# Patient Record
Sex: FEMALE
[De-identification: names, ages, dates, MRNs, and addresses within clinical notes are randomized per-mention and may not be internally consistent; named-entity substitution may affect disease eponyms.]

---

## 2021-08-17 ENCOUNTER — HOSPITAL ENCOUNTER (EMERGENCY)
Dept: HOSPITAL 15 - ER | Age: 46
Discharge: HOME | End: 2021-08-17
Payer: SELF-PAY

## 2021-08-17 VITALS — SYSTOLIC BLOOD PRESSURE: 103 MMHG | DIASTOLIC BLOOD PRESSURE: 54 MMHG

## 2021-08-17 VITALS — BODY MASS INDEX: 34.96 KG/M2 | WEIGHT: 190 LBS | HEIGHT: 62 IN

## 2021-08-17 DIAGNOSIS — N20.0: Primary | ICD-10-CM

## 2021-08-17 DIAGNOSIS — K80.80: ICD-10-CM

## 2021-08-17 DIAGNOSIS — I10: ICD-10-CM

## 2021-08-17 LAB
ALBUMIN SERPL-MCNC: 3.7 G/DL (ref 3.4–5)
ALP SERPL-CCNC: 94 U/L (ref 45–117)
ALT SERPL-CCNC: 37 U/L (ref 13–56)
ANION GAP SERPL CALCULATED.3IONS-SCNC: 6 MMOL/L (ref 5–15)
BILIRUB SERPL-MCNC: 0.4 MG/DL (ref 0.2–1)
BUN SERPL-MCNC: 9 MG/DL (ref 7–18)
BUN/CREAT SERPL: 13.6
CALCIUM SERPL-MCNC: 8.5 MG/DL (ref 8.5–10.1)
CHLORIDE SERPL-SCNC: 107 MMOL/L (ref 98–107)
CO2 SERPL-SCNC: 27 MMOL/L (ref 21–32)
GLUCOSE SERPL-MCNC: 96 MG/DL (ref 74–106)
HCT VFR BLD AUTO: 43.2 % (ref 36–46)
HGB BLD-MCNC: 14.9 G/DL (ref 12.2–16.2)
LIPASE SERPL-CCNC: 82 U/L (ref 73–393)
MCH RBC QN AUTO: 30.4 PG (ref 28–32)
MCV RBC AUTO: 88.5 FL (ref 80–100)
NRBC BLD QL AUTO: 0 %
POTASSIUM SERPL-SCNC: 3.5 MMOL/L (ref 3.5–5.1)
PROT SERPL-MCNC: 7.6 G/DL (ref 6.4–8.2)
SODIUM SERPL-SCNC: 140 MMOL/L (ref 136–145)

## 2021-08-17 PROCEDURE — 96375 TX/PRO/DX INJ NEW DRUG ADDON: CPT

## 2021-08-17 PROCEDURE — 84484 ASSAY OF TROPONIN QUANT: CPT

## 2021-08-17 PROCEDURE — 96361 HYDRATE IV INFUSION ADD-ON: CPT

## 2021-08-17 PROCEDURE — 87086 URINE CULTURE/COLONY COUNT: CPT

## 2021-08-17 PROCEDURE — 36415 COLL VENOUS BLD VENIPUNCTURE: CPT

## 2021-08-17 PROCEDURE — 96374 THER/PROPH/DIAG INJ IV PUSH: CPT

## 2021-08-17 PROCEDURE — 74176 CT ABD & PELVIS W/O CONTRAST: CPT

## 2021-08-17 PROCEDURE — 99284 EMERGENCY DEPT VISIT MOD MDM: CPT

## 2021-08-17 PROCEDURE — 83690 ASSAY OF LIPASE: CPT

## 2021-08-17 PROCEDURE — 85025 COMPLETE CBC W/AUTO DIFF WBC: CPT

## 2021-08-17 PROCEDURE — 80053 COMPREHEN METABOLIC PANEL: CPT

## 2024-12-27 NOTE — ED.PDOC
General


HPI Comments


49 y.o female with PMH of kidney stones, CHF and HTN, presents to the ED for a 

chief complaint of bilateral flank and back pain associated with abdominal pain 

and a cough that started one week ago. Patient reports pain worsened last night,

described as sharp and constant. Patient had similar pain in the past that 

lasted for week intermittently, was diagnosed with kidney stone and a stent 

placed in. At this time, patient denies any hematuria, fever, chills, nauses, 

vomiting, diarrhea.


Chief Complaint:  Flank Pain


Time Seen by MD:  07:18


Primary Care Provider:  YESSENIA PMD


Reviewed notes:  Nurses Notes, Medications, Allergies


Allergies:  


Coded Allergies:  


     NO KNOWN ALLERGIES (Unverified , 21)


Information Source:  Patient


Mode of Arrival:  Ambulatory


Severity:  Moderate


Timing:  Weeks (1)


Duration:  Since onset


Onset:  Spontaneous


Symptoms:  None


History of:  Kidney stone


Location:  Abdomen, (R) Flank, (L)Flank


Modifying factors:  None


associated signs and symptoms:  Abdominal Pain, Flank Pain, Back Pain





Past Medical History


PAST MEDICAL HISTORY:  CHF, HTN, Kidney Stones


Surgical History:  


Surgical History (Other):  


kidney stent


GYN History:  Denies all GYN Hx





Family History


Family History:  Reviewed,noncontributory to illness





Social History


Smoker:  Non-Smoker


Alcohol:  Denies ETOH Use


Drugs:  Denies Drug Use


Lives In:  Home





Constitutional:  denies: chills, diaphoresis, fatigue, fever, malaise, sweats, 

weakness, others


EENTM:  denies: blurred vision, double vision, ear bleeding, ear discharge, ear 

drainage, ear pain, ear ringing, eye pain, eye redness, hearing loss, mouth 

pain, mouth swelling, nasal discharge, nose bleeding, nose congestion, nose 

pain, photophobia, tearing, throat pain, throat swelling, voice changes, others


Respiratory:  reports: cough; denies: hemoptysis, orthopnea, SOB at rest, 

shortness of breath, SOB with excertion, stridor, wheezing, others


Cardiovascular:  denies: chest pain, dizzy spells, diaphoresis, Dyspnea on 

exertion, edema, irregular heart beat, left arm pain, lightheadedness, 

palpitations, PND, syncope, others


Gastrointestinal:  reports: abdominal pain; denies: abdomen distended, blood 

streaked bowels, constipated, diarrhea, dysphagia, difficulty swallowing, 

hematemesis, melena, nausea, poor appetite, poor fluid intake, rectal bleeding, 

rectal pain, vomiting, others


Genitourinary:  reports: flank pain; denies: abnormal vagina bleeding, burning, 

dyspareunia, dysuria, frequency, hematuria, incontinence, pain, pregnant, vagina

discharge, urgency, others


Neurological:  denies: dizziness, fainting, headache, left sided numbness, left 

sided weakness, numbness, paresthesia, pre-existing deficit, right sided 

numbness, right sided weakness, seizure, speech problems, tingling, tremors, w

eakness, others


Musculoskeletal:  reports: back pain; denies: gout, joint pain, joint swelling, 

muscle pain, muscle stiffness, neck pain, others


Integumetry:  denies: bruises, change in color, change in hair/nails, dryness, 

laceration, lesions, lumps, rash, wounds, others


Allergic/Immunocompromised:  denies: Difficulty Healing, Frequent Infections, 

Hives, Itching, others


Hematologic/Lymphatic:  denies: anemia, blood clots, easy bleeding, easy 

bruising, swollen glands, others


Endocrine:  denies: excessive hunger, excessive sweating, excessive thirst, 

excessive urination, flushing, intolerance to cold, intolerance to heat, 

unexplained weight gain, unexplained weight loss, others


Psychiatric:  denies: anxiety, bipolar disorder, depression, hopeless, panic 

disorder, schizophrenia, sleepless, suicidal, others


All Other Systems:  Reviewed and Negative





Physical Exam


General Appearance:  No Apparent Distress, Normal


HEENT:  Normal ENT Inspection, Pharynx Normal, TMs Normal


Neck:  Full Range of Motion, Non-Tender, Normal, Normal Inspection


Respiratory:  Chest Non-Tender, Lungs Clear, No Accessory Muscle Use, No 

Respiratory Distress, Normal Breath Sounds


Cardiovascular:  No Edema, No JVD, No Murmur, No Gallop, Normal Peripheral 

Pulses, Regular Rate/Rhythm


Breast Exam:  Deferred


Gastrointestinal:  No Organomegaly, Non Tender, No Pulsatile Mass, Normal Bowel 

Sounds, Soft


Genitalia:  Deferred


Pelvic:  Deferred


Rectal:  Deferred


Extremities:  No calf tenderness, Normal capillary refill, Normal inspection, 

Normal range of motion, Non-tender, No pedal edema


Musculoskeletal :  


   Location:  Bilateral


   Extremity Location:  Back, Other (flank )


   Apperance:  Tenderness: Moderate


Neurologic:  Alert, CNs II-XII nml as Tested, No Motor Deficits, Normal Affect, 

Normal Mood, No Sensory Deficits


Cerebellar Function:  Normal


Reflexes:  Normal


Skin:  Dry, Normal Color, Warm


Lymphatic:  No Adenopathy





Was a procedure done?


Was a procedure done?:  No





Differential Diagnosis


Kidney stone (Female):  Hepatitis, Musculoskeletal pain, Pancreatitis, 

Pyelonephritis, Renal failure, Strain, Urolithiasis


Kidney stone (Male):  Bowel obstruction, Cholelithiasis, DJD, Hepatitis, 

Cholangitis, Pancreatitis, Pyelonephritis, Renal failure, Urolithiasis, Renal 

infarction, Urinary tract infection, N/A





X-Ray, Labs, Meds, VS





                                   Vital Signs








  Date Time  Temp Pulse Resp B/P (MAP) Pulse Ox O2 Delivery O2 Flow Rate FiO2


 


24 07:35 98.3 91 20 158/97 (117) 97   








                                       Lab








Test


 24


08:07 24


07:30 Range/Units


 


 


White Blood Count


 6.8 


 


 4.4-10.8


10^3/uL


 


Red Blood Count


 4.89 


 


 4.0-5.20


10^6/uL


 


Hemoglobin 14.7   12.2-16.2  g/dL


 


Hematocrit 42.8   36.0-46.0  %


 


Mean Corpuscular Volume 87.5   80.0-100.0  fL


 


Mean Corpuscular Hemoglobin 30.0   28.0-32.0  pg


 


Mean Corpuscular Hemoglobin


Concent 34.3 


 


 32.0-36.0  g/dL





 


Red Cell Distribution Width 13.9   11.8-14.3  %


 


Platelet Count


 276 


 


 140-450


10^3/uL


 


Mean Platelet Volume 8.2   6.9-10.8  fL


 


Neutrophils (%) (Auto) 54.3   37.0-80.0  %


 


Lymphocytes (%) (Auto) 28.0   10.0-50.0  %


 


Monocytes (%) (Auto) 8.7   0.0-12.0  %


 


Eosinophils (%) (Auto) 8.0 H  0.0-7.0  %


 


Basophils (%) (Auto) 1.0   0.0-2.0  %


 


Neutrophils # (Auto)


 3.7 


 


 1.6-8.6  10


^3/uL


 


Lymphocytes # (Auto)


 1.9 


 


 0.4-5.4  10


^3/uL


 


Monocytes # (Auto) 0.6   0-1.3  10 ^3/uL


 


Eosinophils # (Auto) 0.5   0-0.8  10 ^3/uL


 


Basophils # (Auto) 0.1   0-0.2  10 ^3/uL


 


Nucleated Red Blood Cells 0.1    %


 


Sodium Level 142   136-145  mmol/L


 


Potassium Level 3.6   3.5-5.1  mmol/L


 


Chloride Level 107     mmol/L


 


Carbon Dioxide Level 28   20-31  mmol/L


 


Anion Gap 7   5-15  


 


Blood Urea Nitrogen 19   9-23  mg/dL


 


Creatinine


 0.87 


 


 0.550-1.02


mg/dL


 


Glomerular Filtration Rate


Calc 83 


 


 >90  mL/min





 


BUN/Creatinine Ratio 21.8 H  10.0-20.0  


 


Serum Glucose 105     mg/dL


 


Calcium Level 10.1   8.7-10.4  mg/dL


 


Total Bilirubin 0.4   0.2-1.0  mg/dL


 


Aspartate Amino Transferase


(AST) 44 H


 


 13-40  U/L





 


Alanine Aminotransferase (ALT) 85 H  7-40  U/L


 


Alkaline Phosphatase 120 H    U/L


 


Troponin I High Sensitivity 18   </=34  ng/L


 


Total Protein 7.0   5.7-8.2  g/dL


 


Albumin 4.4   3.2-4.8  g/dL


 


Urine Color  Light-yellow  Yellow  


 


Urine Clarity  Clear  Clear  


 


Urine pH  6.5  5.0-9.0  


 


Urine Specific Gravity  1.021  1.001-1.035  


 


Urine Protein  Negative  Negative  


 


Urine Ketones  Negative  Negative  


 


Urine Blood  Negative  Negative  /uL


 


Urine Nitrite  Negative  Negative  


 


Urine Bilirubin  Negative  Negative  


 


Urine Urobilinogen  Normal  Negative  mg/dL


 


Urine Leukocyte Esterase  Trace  Negative  /uL


 


Urine RBC  <1  0 - 4  /hpf


 


Urine WBC  2  0 - 5  /hpf


 


Urine Squamous Epithelial


Cells 


 Few 


 <5  /hpf





 


Urine Bacteria  None seen  None Seen  /hpf


 


Urine Glucose  Normal  Normal  mg/dL








X-Ray, Labs, Meds, VS Comment


This 49-year-old female with a past medical history significant for 

nephrolithiasis and previous stenting presents secondary to bilateral lower back

pain last few days.  She was concerned she may have kidney stones.  CT showed 

multiple nonobstructing kidney stones.  Eye retinal tears with the patient, the 

patient was not feel the narcotic prescriptions since 2024.  I have 

seen the patient at a another facility for the same complaint.  She was provided

narcotic prescription that time.  It.  She did not fill that prescription.  As 

such, discharge the patient home with a prescription for Flomax and Norco.  She 

was asked to have her hydrate and follow up with the PCP for proven 4, 

management of her recurrent kidney stones.


Time of 1ST Reevaluation:  07:30


Reevaluation 1ST:  Unchanged


Patient Education/Counseling:  Diagnosis, Treatment, Prognosis


Family Education/Counseling:  No Family Present


Additional Information








The following tests were ordered, and results were reviewed by me: LAB and CT 

ABD





I reviewed and agreed with the following test results read by other providers: 

CT ABD





I discussed treatment and results with medical personnel and patient





Departure 1


Departure


Time of Disposition:  08:53


Impression:  


   Primary Impression:  


   Kidney stone


   Additional Impression:  


   Flank pain


Disposition:  01 HOME / SELF CARE / HOMELESS


Condition:  Good


Discharged With:  Self





Critical Care Note


Critical Care Time?:  No





Stability


Stability form required:  No








I personally scribed for AMADA UNDERWOOD MD ([email or web address]) on 24 at 07:37.  

Electronically submitted by Lorelei Rodriguez ([facility]).





AMADA UNDERWOOD MD                 Dec 27, 2024 07:37

## 2024-12-27 NOTE — [facility]
CLINICAL INFORMATION:   49 years old, Female; bilateral lower back pain.



TECHNIQUE:  Axial CT images of the abdomen and pelvis were obtained without IV contrast. Coronal and 
sagittal reformatted images were obtained, reviewed, and stored. Evaluation of the parenchymal organs
 is limited without IV contrast. Evaluation of the bowel and mesentery is limited without oral contra
st. All CT scans at this medical facility are performed using dose modulation techniques as appropria
te to a performed exam including the following: Automated exposure control was utilized; adjustment o
f the MA and/or KV according to patient size; and use of iterative reconstruction technique.



CTDIvol =   20.02 mGy DLP = 945.32 mGy-cm



COMPARISON: CT ABD PELVIS WO CONTRAST on DOS: 10/31/21, CT ABD PELVIS WO CONTRAST on DOS: 8/17/21



FINDINGS:  



Lung bases: Lung bases are clear.



Liver:  Liver is at the upper limits of normal in size, measuring up to 16.3 cm in craniocaudal dimen
christian at approximately the mid clavicular line.



Biliary: Multiple calcified gallstones in the gallbladder.



Spleen: Unremarkable.



Pancreas: Grossly unremarkable in its noncontrast enhanced appearance.



Adrenal glands: Unremarkable. No mass.



Kidneys: No hydronephrosis. Small bilateral nonobstructing renal calculi with the largest in the lowe
r pole of the left kidney measuring up to 7 mm.



Aorta/Vascular: No aneurysm or significant calcification.



Retroperitoneum: No mass or lymphadenopathy.



Bowel/mesentery: No small bowel obstruction. No free air or free fluid. Appendix is visualized and ap
pears  unremarkable. There are a few scattered small colonic diverticula without adjacent inflammator
y changes to suggest diverticulitis. Moderate stool in the colon.



Pelvic organs: Grossly unremarkable.



Bladder: Bladder is underdistended and not well evaluated.



Abdominal wall: No mass or hernia.



Bones: No acute fracture or suspicious intraosseous lesion.



IMPRESSION: 



1. Bilateral nonobstructing renal calculi. No hydronephrosis or obstructing calculi visualized.



2. Cholelithiasis.



3. Additional nonacute findings as detailed above.



 



Electronically Signed by: Adryan Garcia at 12/27/2024 07:56:03 AM